# Patient Record
Sex: FEMALE | Race: WHITE | NOT HISPANIC OR LATINO | Employment: FULL TIME | RURAL
[De-identification: names, ages, dates, MRNs, and addresses within clinical notes are randomized per-mention and may not be internally consistent; named-entity substitution may affect disease eponyms.]

---

## 2022-09-23 ENCOUNTER — OFFICE VISIT (OUTPATIENT)
Dept: FAMILY MEDICINE CLINIC | Facility: CLINIC | Age: 32
End: 2022-09-23

## 2022-09-23 VITALS
WEIGHT: 180 LBS | HEART RATE: 81 BPM | DIASTOLIC BLOOD PRESSURE: 80 MMHG | SYSTOLIC BLOOD PRESSURE: 118 MMHG | OXYGEN SATURATION: 98 %

## 2022-09-23 DIAGNOSIS — R51.9 PERSISTENT HEADACHES: ICD-10-CM

## 2022-09-23 DIAGNOSIS — Z00.00 ROUTINE MEDICAL EXAM: Primary | ICD-10-CM

## 2022-09-23 DIAGNOSIS — E56.9 VITAMIN DEFICIENCY: ICD-10-CM

## 2022-09-23 DIAGNOSIS — Z13.1 SCREENING FOR DIABETES MELLITUS: ICD-10-CM

## 2022-09-23 DIAGNOSIS — R00.2 PALPITATIONS: ICD-10-CM

## 2022-09-23 DIAGNOSIS — H53.9 VISION CHANGES: ICD-10-CM

## 2022-09-23 DIAGNOSIS — Z13.220 SCREENING FOR LIPID DISORDERS: ICD-10-CM

## 2022-09-23 PROCEDURE — 99395 PREV VISIT EST AGE 18-39: CPT | Performed by: NURSE PRACTITIONER

## 2022-09-23 PROCEDURE — 36415 COLL VENOUS BLD VENIPUNCTURE: CPT | Performed by: NURSE PRACTITIONER

## 2022-09-23 RX ORDER — TRAZODONE HYDROCHLORIDE 50 MG/1
50-100 TABLET ORAL
COMMUNITY
Start: 2022-09-15

## 2022-09-23 RX ORDER — NORETHINDRONE ACETATE AND ETHINYL ESTRADIOL, ETHINYL ESTRADIOL AND FERROUS FUMARATE 1MG-10(24)
1 KIT ORAL DAILY
COMMUNITY
Start: 2022-07-08

## 2022-09-23 RX ORDER — CRISABOROLE 20 MG/G
OINTMENT TOPICAL
COMMUNITY

## 2022-09-23 RX ORDER — GABAPENTIN 100 MG/1
100-200 CAPSULE ORAL NIGHTLY
COMMUNITY
Start: 2022-08-19

## 2022-09-23 RX ORDER — PROPRANOLOL HYDROCHLORIDE 10 MG/1
TABLET ORAL
COMMUNITY

## 2022-09-23 RX ORDER — SERTRALINE HYDROCHLORIDE 100 MG/1
200 TABLET, FILM COATED ORAL DAILY
COMMUNITY
Start: 2022-09-15

## 2022-09-23 RX ORDER — SULFACETAMIDE SODIUM 100 MG/ML
LOTION TOPICAL
COMMUNITY
Start: 2022-08-01

## 2022-09-23 RX ORDER — DUPILUMAB 300 MG/2ML
INJECTION, SOLUTION SUBCUTANEOUS
COMMUNITY
Start: 2022-07-22

## 2022-09-23 NOTE — PROGRESS NOTES
Chief Complaint  Migraine (Vision changes to left eye. Has seen Optometrist, suggested ocular migraines) and Palpitations    Subjective          Leatha Trejo presents to [facility]  History of Present Illness  Pt has had migraines, vision changes, fatigue, ear ringing, palpitations, anxiety, lightheadedness, and near syncope for the past 2 months. She saw her eye doctor who said her eyes looked fine.   Neurologic Problem  The patient's primary symptoms include near-syncope and a visual change. The patient's pertinent negatives include no altered mental status, clumsiness, focal sensory loss, focal weakness, loss of balance, memory loss, slurred speech, syncope or weakness. This is a new problem. The current episode started more than 1 month ago. The neurological problem developed gradually. The problem has been gradually worsening since onset. There was no focality noted. Associated symptoms include an auditory change, an aura, fatigue, headaches, light-headedness and palpitations. Pertinent negatives include no abdominal pain, back pain, bladder incontinence, bowel incontinence, chest pain, confusion, diaphoresis, dizziness, fever, nausea, neck pain, shortness of breath, vertigo or vomiting. Past treatments include acetaminophen and sleep. The treatment provided mild relief.       Objective   Vital Signs:   /80   Pulse 81   Wt 81.6 kg (180 lb)   SpO2 98%     There is no height or weight on file to calculate BMI.    Review of Systems   Constitutional: Positive for fatigue. Negative for diaphoresis and fever.   Respiratory: Negative for shortness of breath.    Cardiovascular: Positive for palpitations and near-syncope. Negative for chest pain and leg swelling.   Gastrointestinal: Negative for abdominal pain, bowel incontinence, nausea and vomiting.   Genitourinary: Negative for urinary incontinence.   Musculoskeletal: Negative for back pain and neck pain.   Neurological:  Positive for light-headedness. Negative for dizziness, vertigo, focal weakness, syncope, weakness, loss of balance and confusion.   Psychiatric/Behavioral: Negative for memory loss. The patient is not nervous/anxious.           Current Outpatient Medications:   •  Crisaborole (Eucrisa) 2 % ointment, Eucrisa 2 % topical ointment  APPLY TO HANDS TWICE DAILY WHEN NOT USING OTHER, Disp: , Rfl:   •  Dupixent 300 MG/2ML solution pen-injector, , Disp: , Rfl:   •  gabapentin (NEURONTIN) 100 MG capsule, Take 100-200 mg by mouth Every Night., Disp: , Rfl:   •  Lo Loestrin Fe 1 MG-10 MCG / 10 MCG tablet, Take 1 tablet by mouth Daily., Disp: , Rfl:   •  propranolol (INDERAL) 10 MG tablet, propranolol 10 mg tablet  TAKE 1 TABLET BY MOUTH TWICE DAILY  prn, Disp: , Rfl:   •  sertraline (ZOLOFT) 100 MG tablet, Take 200 mg by mouth Daily., Disp: , Rfl:   •  Sulfacetamide Sodium, Acne, 10 % lotion, APPLY TOPICALLY TO THE AFFECTED AREA 1 TO 2 TIMES DAILY, Disp: , Rfl:   •  traZODone (DESYREL) 50 MG tablet, Take  mg by mouth every night at bedtime., Disp: , Rfl:       Allergies: Patient has no known allergies.    Physical Exam  Constitutional:       Appearance: Normal appearance.   HENT:      Head: Normocephalic.   Eyes:      Conjunctiva/sclera: Conjunctivae normal.      Pupils: Pupils are equal, round, and reactive to light.   Cardiovascular:      Rate and Rhythm: Normal rate and regular rhythm.      Heart sounds: Normal heart sounds.   Pulmonary:      Effort: Pulmonary effort is normal.      Breath sounds: Normal breath sounds.   Abdominal:      Tenderness: There is no abdominal tenderness.   Musculoskeletal:         General: Normal range of motion.   Skin:     General: Skin is warm and dry.      Capillary Refill: Capillary refill takes less than 2 seconds.   Neurological:      General: No focal deficit present.      Mental Status: She is alert and oriented to person, place, and time.   Psychiatric:         Mood and Affect:  Mood normal.         Behavior: Behavior normal.         Thought Content: Thought content normal.         Judgment: Judgment normal.          Result Review :                   Assessment and Plan    Diagnoses and all orders for this visit:    1. Routine medical exam (Primary)  Comments:  We discussed diet and exercise.  Labs drawn.  Pap up-to-date  Orders:  -     CBC & Differential; Future  -     Comprehensive Metabolic Panel; Future  -     TSH; Future  -     CBC & Differential  -     Comprehensive Metabolic Panel  -     TSH    2. Screening for diabetes mellitus  -     Hemoglobin A1c; Future  -     Hemoglobin A1c    3. Screening for lipid disorders    4. Vitamin deficiency  -     Vitamin B12; Future  -     Vitamin D 25 Hydroxy; Future  -     Folate; Future  -     Vitamin B12  -     Vitamin D 25 Hydroxy  -     Folate    5. Palpitations  Comments:  Follow-up with cardiology.  Return for worsening symptoms.  Decrease stimulants and caffeine.  Orders:  -     Ambulatory Referral to Cardiology    6. Persistent headaches  Comments:  MRI ordered.  We will refer to neurology pending results.  Orders:  -     Ambulatory Referral to Neurology  -     MRI Brain Without Contrast; Future    7. Vision changes  -     Ambulatory Referral to Neurology  -     MRI Brain Without Contrast; Future                Follow Up   Return in about 1 month (around 10/23/2022) for if not improving or sooner if symptoms worsen.  Patient was given instructions and counseling regarding her condition or for health maintenance advice. Please see specific information pulled into the AVS if appropriate.     CARROLL Henriquez

## 2022-09-24 LAB
25(OH)D3+25(OH)D2 SERPL-MCNC: 23.9 NG/ML (ref 30–100)
ALBUMIN SERPL-MCNC: 4.3 G/DL (ref 3.8–4.8)
ALBUMIN/GLOB SERPL: 1.9 {RATIO} (ref 1.2–2.2)
ALP SERPL-CCNC: 78 IU/L (ref 44–121)
ALT SERPL-CCNC: 25 IU/L (ref 0–32)
AST SERPL-CCNC: 22 IU/L (ref 0–40)
BASOPHILS # BLD AUTO: 0 X10E3/UL (ref 0–0.2)
BASOPHILS NFR BLD AUTO: 1 %
BILIRUB SERPL-MCNC: 0.3 MG/DL (ref 0–1.2)
BUN SERPL-MCNC: 12 MG/DL (ref 6–20)
BUN/CREAT SERPL: 22 (ref 9–23)
CALCIUM SERPL-MCNC: 9.1 MG/DL (ref 8.7–10.2)
CHLORIDE SERPL-SCNC: 104 MMOL/L (ref 96–106)
CO2 SERPL-SCNC: 25 MMOL/L (ref 20–29)
CREAT SERPL-MCNC: 0.54 MG/DL (ref 0.57–1)
EGFRCR SERPLBLD CKD-EPI 2021: 126 ML/MIN/1.73
EOSINOPHIL # BLD AUTO: 0.1 X10E3/UL (ref 0–0.4)
EOSINOPHIL NFR BLD AUTO: 2 %
ERYTHROCYTE [DISTWIDTH] IN BLOOD BY AUTOMATED COUNT: 11.7 % (ref 11.7–15.4)
FOLATE SERPL-MCNC: 10.3 NG/ML
GLOBULIN SER CALC-MCNC: 2.3 G/DL (ref 1.5–4.5)
GLUCOSE SERPL-MCNC: 82 MG/DL (ref 65–99)
HBA1C MFR BLD: 5.6 % (ref 4.8–5.6)
HCT VFR BLD AUTO: 37.8 % (ref 34–46.6)
HGB BLD-MCNC: 12.6 G/DL (ref 11.1–15.9)
IMM GRANULOCYTES # BLD AUTO: 0 X10E3/UL (ref 0–0.1)
IMM GRANULOCYTES NFR BLD AUTO: 0 %
LYMPHOCYTES # BLD AUTO: 1.8 X10E3/UL (ref 0.7–3.1)
LYMPHOCYTES NFR BLD AUTO: 29 %
MCH RBC QN AUTO: 30.7 PG (ref 26.6–33)
MCHC RBC AUTO-ENTMCNC: 33.3 G/DL (ref 31.5–35.7)
MCV RBC AUTO: 92 FL (ref 79–97)
MONOCYTES # BLD AUTO: 0.4 X10E3/UL (ref 0.1–0.9)
MONOCYTES NFR BLD AUTO: 7 %
NEUTROPHILS # BLD AUTO: 3.8 X10E3/UL (ref 1.4–7)
NEUTROPHILS NFR BLD AUTO: 61 %
PLATELET # BLD AUTO: 279 X10E3/UL (ref 150–450)
POTASSIUM SERPL-SCNC: 4.7 MMOL/L (ref 3.5–5.2)
PROT SERPL-MCNC: 6.6 G/DL (ref 6–8.5)
RBC # BLD AUTO: 4.11 X10E6/UL (ref 3.77–5.28)
SODIUM SERPL-SCNC: 144 MMOL/L (ref 134–144)
TSH SERPL DL<=0.005 MIU/L-ACNC: 0.78 UIU/ML (ref 0.45–4.5)
VIT B12 SERPL-MCNC: 469 PG/ML (ref 232–1245)
WBC # BLD AUTO: 6.2 X10E3/UL (ref 3.4–10.8)

## 2022-09-27 NOTE — PROGRESS NOTES
Your vitamin D was low so take 2,000 units of vitamin D daily. Everything else looked good. Take care!

## 2022-09-29 PROBLEM — R00.2 PALPITATIONS: Status: ACTIVE | Noted: 2022-09-29

## 2022-10-03 ENCOUNTER — OFFICE VISIT (OUTPATIENT)
Dept: CARDIOLOGY | Facility: CLINIC | Age: 32
End: 2022-10-03

## 2022-10-03 VITALS
SYSTOLIC BLOOD PRESSURE: 108 MMHG | WEIGHT: 179.4 LBS | HEART RATE: 79 BPM | DIASTOLIC BLOOD PRESSURE: 66 MMHG | BODY MASS INDEX: 25.68 KG/M2 | HEIGHT: 70 IN | OXYGEN SATURATION: 98 %

## 2022-10-03 DIAGNOSIS — R06.09 DOE (DYSPNEA ON EXERTION): Primary | ICD-10-CM

## 2022-10-03 DIAGNOSIS — R07.2 PRECORDIAL CHEST PAIN: ICD-10-CM

## 2022-10-03 DIAGNOSIS — R00.2 PALPITATIONS: ICD-10-CM

## 2022-10-03 PROCEDURE — 93000 ELECTROCARDIOGRAM COMPLETE: CPT | Performed by: INTERNAL MEDICINE

## 2022-10-03 PROCEDURE — 99204 OFFICE O/P NEW MOD 45 MIN: CPT | Performed by: INTERNAL MEDICINE

## 2022-10-03 RX ORDER — CLOBETASOL PROPIONATE 0.5 MG/G
1 OINTMENT TOPICAL 2 TIMES DAILY
COMMUNITY

## 2022-10-03 RX ORDER — ESZOPICLONE 2 MG/1
2 TABLET, FILM COATED ORAL NIGHTLY
COMMUNITY
End: 2023-03-10

## 2022-10-22 ENCOUNTER — HOSPITAL ENCOUNTER (OUTPATIENT)
Dept: MRI IMAGING | Facility: HOSPITAL | Age: 32
Discharge: HOME | End: 2022-10-22
Admitting: NURSE PRACTITIONER

## 2022-10-22 DIAGNOSIS — R51.9 PERSISTENT HEADACHES: ICD-10-CM

## 2022-10-22 DIAGNOSIS — H53.9 VISION CHANGES: ICD-10-CM

## 2022-10-22 PROCEDURE — 70551 MRI BRAIN STEM W/O DYE: CPT

## 2022-10-25 ENCOUNTER — TELEPHONE (OUTPATIENT)
Dept: CARDIOLOGY | Facility: CLINIC | Age: 32
End: 2022-10-25

## 2022-10-25 NOTE — TELEPHONE ENCOUNTER
----- Message from Gigi Corley MD sent at 10/25/2022  8:49 AM EDT -----  Please inform the patient of their test results. Thank you.

## 2023-03-10 ENCOUNTER — OFFICE VISIT (OUTPATIENT)
Dept: FAMILY MEDICINE CLINIC | Facility: CLINIC | Age: 33
End: 2023-03-10
Payer: COMMERCIAL

## 2023-03-10 VITALS
BODY MASS INDEX: 26.34 KG/M2 | HEIGHT: 70 IN | SYSTOLIC BLOOD PRESSURE: 118 MMHG | WEIGHT: 184 LBS | HEART RATE: 89 BPM | DIASTOLIC BLOOD PRESSURE: 74 MMHG | OXYGEN SATURATION: 97 %

## 2023-03-10 DIAGNOSIS — Z13.220 LIPID SCREENING: ICD-10-CM

## 2023-03-10 DIAGNOSIS — R53.83 FATIGUE, UNSPECIFIED TYPE: ICD-10-CM

## 2023-03-10 DIAGNOSIS — Z00.00 ROUTINE PHYSICAL EXAMINATION: Primary | ICD-10-CM

## 2023-03-10 DIAGNOSIS — Z13.1 DIABETES MELLITUS SCREENING: ICD-10-CM

## 2023-03-10 DIAGNOSIS — Z13.29 THYROID DISORDER SCREENING: ICD-10-CM

## 2023-03-10 PROCEDURE — 99395 PREV VISIT EST AGE 18-39: CPT | Performed by: PHYSICIAN ASSISTANT

## 2023-03-10 PROCEDURE — 36415 COLL VENOUS BLD VENIPUNCTURE: CPT | Performed by: PHYSICIAN ASSISTANT

## 2023-03-10 RX ORDER — LAMOTRIGINE 100 MG/1
TABLET ORAL
COMMUNITY
Start: 2023-02-02

## 2023-03-10 NOTE — PROGRESS NOTES
"Chief Complaint  Annual Exam (Wants to be checked for diabetes )    Subjective          Leatha Trejo presents to [facility]  History of Present Illness  Patient today for routine physical exam as well as labs.  She states has been bothered by fatigue for the last year.  She was diagnosed with sleep apnea and using a CPAP machine but has not noticed significant improvement with her fatigue levels.  She is wanting to be tested for diabetes as runs in her family.  She states she has noticed some increase in thirst at times.  She has been trying to work on a lower carb diet.  She states she gets exercise.  She denies any chest pain or shortness of breath.  She is seeing behavioral health for her anxiety and depression medications.  She is currently up-to-date on her GYN exam. She is currently utd on eye exam- was diagnosed with some floaters.       Objective   Vital Signs:   /74 (BP Location: Left arm, Patient Position: Sitting, Cuff Size: Large Adult)   Pulse 89   Ht 176.5 cm (69.5\")   Wt 83.5 kg (184 lb)   SpO2 97%   BMI 26.78 kg/m²     Body mass index is 26.78 kg/m².    Review of Systems   Constitutional: Positive for fatigue.   HENT: Negative for congestion and sore throat.    Respiratory: Negative for cough and shortness of breath.    Cardiovascular: Negative for chest pain and palpitations.   Gastrointestinal: Negative for abdominal pain, diarrhea, nausea and vomiting.   Endocrine: Positive for polydipsia. Negative for polyphagia and polyuria.   Genitourinary: Negative for dysuria and frequency.   Neurological: Negative for dizziness and headache.   Psychiatric/Behavioral: Positive for sleep disturbance and depressed mood. The patient is nervous/anxious.        Past History:  Medical History: has a past medical history of Anxiety, Depression, IBS (irritable bowel syndrome), and Insomnia.   Surgical History: has a past surgical history that includes  section and " Tonsillectomy (2011).   Family History: family history includes Diabetes in her father; Obesity in her mother.   Social History: reports that she has never smoked. She has never used smokeless tobacco. She reports that she does not currently use alcohol. She reports that she does not use drugs.      Current Outpatient Medications:   •  clobetasol (TEMOVATE) 0.05 % ointment, Apply 1 application topically to the appropriate area as directed 2 (Two) Times a Day., Disp: , Rfl:   •  Crisaborole (Eucrisa) 2 % ointment, Eucrisa 2 % topical ointment  APPLY TO HANDS TWICE DAILY WHEN NOT USING OTHER, Disp: , Rfl:   •  Dupixent 300 MG/2ML solution pen-injector, Every 2 (Two) Months., Disp: , Rfl:   •  gabapentin (NEURONTIN) 100 MG capsule, Take 1-2 capsules by mouth Every Night., Disp: , Rfl:   •  lamoTRIgine (LaMICtal) 100 MG tablet, , Disp: , Rfl:   •  Lo Loestrin Fe 1 MG-10 MCG / 10 MCG tablet, Take 1 tablet by mouth Daily., Disp: , Rfl:   •  propranolol (INDERAL) 10 MG tablet, propranolol 10 mg tablet  TAKE 1 TABLET BY MOUTH TWICE DAILY  prn, Disp: , Rfl:   •  sertraline (ZOLOFT) 100 MG tablet, Take 2 tablets by mouth Daily., Disp: , Rfl:   •  Sulfacetamide Sodium, Acne, 10 % lotion, APPLY TOPICALLY TO THE AFFECTED AREA 1 TO 2 TIMES DAILY, Disp: , Rfl:   •  traZODone (DESYREL) 50 MG tablet, Take 1-2 tablets by mouth every night at bedtime., Disp: , Rfl:   Allergies: Patient has no known allergies.    Physical Exam  Constitutional:       Appearance: Normal appearance.   HENT:      Right Ear: Tympanic membrane normal.      Left Ear: Tympanic membrane normal.      Mouth/Throat:      Pharynx: Oropharynx is clear.   Eyes:      Conjunctiva/sclera: Conjunctivae normal.      Pupils: Pupils are equal, round, and reactive to light.   Cardiovascular:      Rate and Rhythm: Normal rate and regular rhythm.      Heart sounds: Normal heart sounds.   Pulmonary:      Effort: Pulmonary effort is normal.      Breath sounds: Normal breath  sounds.   Abdominal:      Palpations: Abdomen is soft.      Tenderness: There is no abdominal tenderness.   Neurological:      Mental Status: She is oriented to person, place, and time.   Psychiatric:         Mood and Affect: Mood normal.         Behavior: Behavior normal.             Assessment and Plan   Diagnoses and all orders for this visit:    1. Routine physical examination (Primary)  Encouraged healthy diet and exercise. Will check labs today. Encouraged to continue f/up with behavioral health as directed for her med management.    2. Lipid screening  -     Lipid Panel; Future  Lipid panel today.   3. Diabetes mellitus screening  -     Comprehensive Metabolic Panel; Future  -     Hemoglobin A1c; Future  Hga1c with labs.   4. Thyroid disorder screening  -     TSH; Future    5. Fatigue, unspecified type  -     CBC & Differential; Future  -     Vitamin B12; Future  Continue with cpap . Will check some labs today. RTC if not improving.          Follow Up   No follow-ups on file.  Patient was given instructions and counseling regarding her condition or for health maintenance advice. Please see specific information pulled into the AVS if appropriate.     Yajaira Gee PA-C

## 2023-03-11 LAB
ALBUMIN SERPL-MCNC: 4.5 G/DL (ref 3.8–4.8)
ALBUMIN/GLOB SERPL: 1.9 {RATIO} (ref 1.2–2.2)
ALP SERPL-CCNC: 78 IU/L (ref 44–121)
ALT SERPL-CCNC: 27 IU/L (ref 0–32)
AST SERPL-CCNC: 23 IU/L (ref 0–40)
BASOPHILS # BLD AUTO: 0.1 X10E3/UL (ref 0–0.2)
BASOPHILS NFR BLD AUTO: 1 %
BILIRUB SERPL-MCNC: 0.5 MG/DL (ref 0–1.2)
BUN SERPL-MCNC: 14 MG/DL (ref 6–20)
BUN/CREAT SERPL: 17 (ref 9–23)
CALCIUM SERPL-MCNC: 9.3 MG/DL (ref 8.7–10.2)
CHLORIDE SERPL-SCNC: 103 MMOL/L (ref 96–106)
CHOLEST SERPL-MCNC: 172 MG/DL (ref 100–199)
CO2 SERPL-SCNC: 26 MMOL/L (ref 20–29)
CREAT SERPL-MCNC: 0.82 MG/DL (ref 0.57–1)
EGFRCR SERPLBLD CKD-EPI 2021: 97 ML/MIN/1.73
EOSINOPHIL # BLD AUTO: 0.1 X10E3/UL (ref 0–0.4)
EOSINOPHIL NFR BLD AUTO: 1 %
ERYTHROCYTE [DISTWIDTH] IN BLOOD BY AUTOMATED COUNT: 11.8 % (ref 11.7–15.4)
GLOBULIN SER CALC-MCNC: 2.4 G/DL (ref 1.5–4.5)
GLUCOSE SERPL-MCNC: 93 MG/DL (ref 70–99)
HBA1C MFR BLD: 5.4 % (ref 4.8–5.6)
HCT VFR BLD AUTO: 40.3 % (ref 34–46.6)
HDLC SERPL-MCNC: 65 MG/DL
HGB BLD-MCNC: 13.6 G/DL (ref 11.1–15.9)
IMM GRANULOCYTES # BLD AUTO: 0 X10E3/UL (ref 0–0.1)
IMM GRANULOCYTES NFR BLD AUTO: 0 %
LDLC SERPL CALC-MCNC: 91 MG/DL (ref 0–99)
LYMPHOCYTES # BLD AUTO: 2 X10E3/UL (ref 0.7–3.1)
LYMPHOCYTES NFR BLD AUTO: 30 %
MCH RBC QN AUTO: 30.6 PG (ref 26.6–33)
MCHC RBC AUTO-ENTMCNC: 33.7 G/DL (ref 31.5–35.7)
MCV RBC AUTO: 91 FL (ref 79–97)
MONOCYTES # BLD AUTO: 0.5 X10E3/UL (ref 0.1–0.9)
MONOCYTES NFR BLD AUTO: 8 %
NEUTROPHILS # BLD AUTO: 4 X10E3/UL (ref 1.4–7)
NEUTROPHILS NFR BLD AUTO: 60 %
PLATELET # BLD AUTO: 259 X10E3/UL (ref 150–450)
POTASSIUM SERPL-SCNC: 4 MMOL/L (ref 3.5–5.2)
PROT SERPL-MCNC: 6.9 G/DL (ref 6–8.5)
RBC # BLD AUTO: 4.44 X10E6/UL (ref 3.77–5.28)
SODIUM SERPL-SCNC: 142 MMOL/L (ref 134–144)
TRIGL SERPL-MCNC: 86 MG/DL (ref 0–149)
TSH SERPL DL<=0.005 MIU/L-ACNC: 1.03 UIU/ML (ref 0.45–4.5)
VIT B12 SERPL-MCNC: 620 PG/ML (ref 232–1245)
VLDLC SERPL CALC-MCNC: 16 MG/DL (ref 5–40)
WBC # BLD AUTO: 6.7 X10E3/UL (ref 3.4–10.8)

## 2023-03-22 ENCOUNTER — TELEPHONE (OUTPATIENT)
Dept: FAMILY MEDICINE CLINIC | Facility: CLINIC | Age: 33
End: 2023-03-22
Payer: COMMERCIAL

## 2023-03-22 NOTE — TELEPHONE ENCOUNTER
Please call pharmacy and see whom has been prescribing and when was last fill- she will need a psychiatry appt; thanks

## 2023-03-22 NOTE — TELEPHONE ENCOUNTER
Patient states her mental health clinic has closed. She wants to know if you would be able to fill her medications?

## 2023-03-27 NOTE — TELEPHONE ENCOUNTER
Angeline Jordan MA         12:22 PM  Note   HUB TO READ  Called [facility] got pt an appt with Dr. Arredondo on may 4th at 10 am needs to be 15-20 min early and let know is at office in [place].   They said they will send a message back to Dr. Arredondo to ask if can fill rx until her appt.          PATIENT INFORMED OF HUB TO READ AND UNDERSTOOD

## 2023-03-27 NOTE — TELEPHONE ENCOUNTER
Please call [facility] and see if this provider was through them and see if can schedule patient an appt with other provider; thanks

## 2023-03-27 NOTE — TELEPHONE ENCOUNTER
HUB TO READ  Called [facility] got pt an appt with Dr. Arredondo on may 4th at 10 am needs to be 15-20 min early and let know is at office in [place].   They said they will send a message back to Dr. Arredondo to ask if can fill rx until her appt.

## 2023-03-27 NOTE — TELEPHONE ENCOUNTER
"Called pharmacy they said   Qum055 mg 1-2 tab  filled 2/2 30 day supply by Dr. Joey Arredondo  Sertraline- 30 days filled 3/11 same dr Sarkarzodone 50 1-2 tab 30 day supply on 3/11 same Dr.  Lamictal-100 1/2 2x a day filled on 3/11 for 30days. Same BHASKAR Krishnamurthy TO READ  \" TRIED TO CALL PT TO LET KNOW NEEDS A PSYCHIATRY APPT.\"   "

## 2024-04-02 ENCOUNTER — TELEPHONE (OUTPATIENT)
Dept: FAMILY MEDICINE CLINIC | Facility: CLINIC | Age: 34
End: 2024-04-02

## 2024-04-02 NOTE — TELEPHONE ENCOUNTER
Spoke with patient. Advised patient needs an appointment. Pt made an appointment April 12th at 3:15

## 2024-04-02 NOTE — TELEPHONE ENCOUNTER
Caller: Leatha Trejo    Relationship: Self    Best call back number: [phone]     What orders are you requesting (i.e. lab or imaging): BLOOD WORK    In what timeframe would the patient need to come in: ASAP    Where will you receive your lab/imaging services: OFFICE    Additional notes: PT WOULD LIKE ORDERS FOR BLOOD WORK FOR FATIGUE, HORMONES AND THYROID.

## 2024-04-12 ENCOUNTER — OFFICE VISIT (OUTPATIENT)
Dept: FAMILY MEDICINE CLINIC | Facility: CLINIC | Age: 34
End: 2024-04-12
Payer: COMMERCIAL

## 2024-04-12 VITALS
SYSTOLIC BLOOD PRESSURE: 100 MMHG | OXYGEN SATURATION: 97 % | HEART RATE: 84 BPM | HEIGHT: 70 IN | WEIGHT: 179 LBS | BODY MASS INDEX: 25.62 KG/M2 | DIASTOLIC BLOOD PRESSURE: 70 MMHG

## 2024-04-12 DIAGNOSIS — M25.50 MULTIPLE JOINT PAIN: ICD-10-CM

## 2024-04-12 DIAGNOSIS — Z13.220 LIPID SCREENING: ICD-10-CM

## 2024-04-12 DIAGNOSIS — Z13.29 THYROID DISORDER SCREENING: ICD-10-CM

## 2024-04-12 DIAGNOSIS — Z13.1 DIABETES MELLITUS SCREENING: ICD-10-CM

## 2024-04-12 DIAGNOSIS — R19.8 ALTERNATING CONSTIPATION AND DIARRHEA: ICD-10-CM

## 2024-04-12 DIAGNOSIS — Z11.59 ENCOUNTER FOR HEPATITIS C SCREENING TEST FOR LOW RISK PATIENT: ICD-10-CM

## 2024-04-12 DIAGNOSIS — Z00.00 ROUTINE PHYSICAL EXAMINATION: Primary | ICD-10-CM

## 2024-04-12 PROCEDURE — 99395 PREV VISIT EST AGE 18-39: CPT | Performed by: PHYSICIAN ASSISTANT

## 2024-04-15 LAB
ALBUMIN SERPL-MCNC: 4 G/DL (ref 3.9–4.9)
ALBUMIN/GLOB SERPL: 1.3 {RATIO} (ref 1.2–2.2)
ALP SERPL-CCNC: 84 IU/L (ref 44–121)
ALT SERPL-CCNC: 36 IU/L (ref 0–32)
ANA SER QL: POSITIVE
AST SERPL-CCNC: 24 IU/L (ref 0–40)
BASOPHILS # BLD AUTO: 0 X10E3/UL (ref 0–0.2)
BASOPHILS NFR BLD AUTO: 1 %
BILIRUB SERPL-MCNC: 0.3 MG/DL (ref 0–1.2)
BUN SERPL-MCNC: 16 MG/DL (ref 6–20)
BUN/CREAT SERPL: 22 (ref 9–23)
CALCIUM SERPL-MCNC: 9.2 MG/DL (ref 8.7–10.2)
CHLORIDE SERPL-SCNC: 102 MMOL/L (ref 96–106)
CHOLEST SERPL-MCNC: 153 MG/DL (ref 100–199)
CO2 SERPL-SCNC: 25 MMOL/L (ref 20–29)
CREAT SERPL-MCNC: 0.72 MG/DL (ref 0.57–1)
DSDNA AB SER-ACNC: <1 IU/ML (ref 0–9)
EGFRCR SERPLBLD CKD-EPI 2021: 113 ML/MIN/1.73
EOSINOPHIL # BLD AUTO: 0.1 X10E3/UL (ref 0–0.4)
EOSINOPHIL NFR BLD AUTO: 1 %
ERYTHROCYTE [DISTWIDTH] IN BLOOD BY AUTOMATED COUNT: 11.8 % (ref 11.7–15.4)
ERYTHROCYTE [SEDIMENTATION RATE] IN BLOOD BY WESTERGREN METHOD: 2 MM/HR (ref 0–32)
GLOBULIN SER CALC-MCNC: 3 G/DL (ref 1.5–4.5)
GLUCOSE SERPL-MCNC: 106 MG/DL (ref 70–99)
HBA1C MFR BLD: 5.8 % (ref 4.8–5.6)
HCT VFR BLD AUTO: 39 % (ref 34–46.6)
HCV IGG SERPL QL IA: NON REACTIVE
HDLC SERPL-MCNC: 58 MG/DL
HGB BLD-MCNC: 13.1 G/DL (ref 11.1–15.9)
IMM GRANULOCYTES # BLD AUTO: 0 X10E3/UL (ref 0–0.1)
IMM GRANULOCYTES NFR BLD AUTO: 0 %
LDLC SERPL CALC-MCNC: 82 MG/DL (ref 0–99)
LYMPHOCYTES # BLD AUTO: 2.2 X10E3/UL (ref 0.7–3.1)
LYMPHOCYTES NFR BLD AUTO: 31 %
Lab: NORMAL
MCH RBC QN AUTO: 30.6 PG (ref 26.6–33)
MCHC RBC AUTO-ENTMCNC: 33.6 G/DL (ref 31.5–35.7)
MCV RBC AUTO: 91 FL (ref 79–97)
MONOCYTES # BLD AUTO: 0.4 X10E3/UL (ref 0.1–0.9)
MONOCYTES NFR BLD AUTO: 5 %
NEUTROPHILS # BLD AUTO: 4.4 X10E3/UL (ref 1.4–7)
NEUTROPHILS NFR BLD AUTO: 62 %
PLATELET # BLD AUTO: 268 X10E3/UL (ref 150–450)
POTASSIUM SERPL-SCNC: 4.4 MMOL/L (ref 3.5–5.2)
PROT SERPL-MCNC: 7 G/DL (ref 6–8.5)
RBC # BLD AUTO: 4.28 X10E6/UL (ref 3.77–5.28)
RHEUMATOID FACT SERPL-ACNC: <10 IU/ML
SODIUM SERPL-SCNC: 138 MMOL/L (ref 134–144)
TRIGL SERPL-MCNC: 65 MG/DL (ref 0–149)
TSH SERPL DL<=0.005 MIU/L-ACNC: 0.55 UIU/ML (ref 0.45–4.5)
VLDLC SERPL CALC-MCNC: 13 MG/DL (ref 5–40)
WBC # BLD AUTO: 7.1 X10E3/UL (ref 3.4–10.8)

## 2024-04-15 NOTE — PROGRESS NOTES
"Chief Complaint  Fatigue (Fatigue going on for years seen ) and GI Problem (Pt asking for referral for GI has constipation and diarrhea off and on for years but noticed getting worse )    Subjective          Leatha Trejo presents to [facility]  History of Present Illness  Patient in today for physical exam and would like to return to clinic for  fasting labs. States has been bothered by intermittent symptoms of fatigue for the past year along with multiple joint pains . States in past had abnormal autoimmune screen but has not f/up with rheumatology for past few years. She also states has alternating diarrhea and constipation that has progressed and would like to get in with GI for consult. Denies blood in stool.   Fatigue  This is a chronic problem. Associated symptoms include arthralgias and fatigue. Pertinent negatives include no abdominal pain, congestion, coughing, fever, nausea, sore throat or vomiting.   GI Problem  The primary symptoms include fatigue and arthralgias. Primary symptoms do not include fever, abdominal pain, nausea, vomiting, diarrhea, melena or dysuria.       Objective   Vital Signs:   /70   Pulse 84   Ht 176.5 cm (69.5\")   Wt 81.2 kg (179 lb)   SpO2 97%   BMI 26.05 kg/m²     Body mass index is 26.05 kg/m².    Review of Systems   Constitutional:  Positive for fatigue. Negative for fever.   HENT:  Negative for congestion and sore throat.    Respiratory:  Negative for cough.    Gastrointestinal:  Negative for abdominal pain, diarrhea, melena, nausea and vomiting.   Genitourinary:  Negative for dysuria and frequency.   Musculoskeletal:  Positive for arthralgias.   Neurological:  Negative for dizziness and headache.       Past History:  Medical History: has a past medical history of Anxiety, Depression, IBS (irritable bowel syndrome), and Insomnia.   Surgical History: has a past surgical history that includes  section and Tonsillectomy (). "   Family History: family history includes Arthritis in her mother; Depression in her father and mother; Diabetes in her father and mother; Hyperlipidemia in her father; Obesity in her mother; Other in her father.   Social History: reports that she has never smoked. She has never used smokeless tobacco. She reports that she does not currently use alcohol. She reports that she does not use drugs.      Current Outpatient Medications:     clobetasol (TEMOVATE) 0.05 % ointment, Apply 1 Application topically to the appropriate area as directed 2 (Two) Times a Day., Disp: , Rfl:     Crisaborole (Eucrisa) 2 % ointment, Eucrisa 2 % topical ointment  APPLY TO HANDS TWICE DAILY WHEN NOT USING OTHER, Disp: , Rfl:     Dupixent 300 MG/2ML solution pen-injector, Every 2 (Two) Months., Disp: , Rfl:     gabapentin (NEURONTIN) 100 MG capsule, Take 1-2 capsules by mouth Every Night., Disp: , Rfl:     sertraline (ZOLOFT) 100 MG tablet, Take 2 tablets by mouth Daily., Disp: , Rfl:     Sulfacetamide Sodium, Acne, 10 % lotion, APPLY TOPICALLY TO THE AFFECTED AREA 1 TO 2 TIMES DAILY, Disp: , Rfl:     traZODone (DESYREL) 50 MG tablet, Take 1-2 tablets by mouth every night at bedtime., Disp: , Rfl:   Allergies: Patient has no known allergies.    Physical Exam  Constitutional:       Appearance: Normal appearance.   HENT:      Right Ear: Tympanic membrane normal.      Left Ear: Tympanic membrane normal.      Mouth/Throat:      Pharynx: Oropharynx is clear.   Eyes:      Conjunctiva/sclera: Conjunctivae normal.      Pupils: Pupils are equal, round, and reactive to light.   Cardiovascular:      Rate and Rhythm: Normal rate and regular rhythm.      Heart sounds: Normal heart sounds.   Pulmonary:      Effort: Pulmonary effort is normal.      Breath sounds: Normal breath sounds.   Abdominal:      Palpations: Abdomen is soft.      Tenderness: There is no abdominal tenderness.   Neurological:      Mental Status: She is oriented to person, place, and  time.   Psychiatric:         Mood and Affect: Mood normal.         Behavior: Behavior normal.             Assessment and Plan   Diagnoses and all orders for this visit:    1. Routine physical examination (Primary)  -     CBC & Differential  Encouraged healthy diet and exercise. Encouraged to stay utd on eye exam, dental exam and gyn exam.   2. Alternating constipation and diarrhea  -     Ambulatory Referral to Gastroenterology  Will put in referral for GI consult.   3. Encounter for hepatitis C screening test for low risk patient  -     Hepatitis C Antibody    4. Diabetes mellitus screening  -     Comprehensive Metabolic Panel  -     Hemoglobin A1c  Will check hga1c with labs. Encouraged healthy diet  and exercise.   5. Thyroid disorder screening  -     TSH  Will check TSH with labs.   6. Lipid screening  -     Lipid Panel    7. Multiple joint pain  -     AIDA  -     Sedimentation Rate  -     Rheumatoid Factor  Will check AIDA , ESR and RF with labs and she may follow back up with rheumatology.           Follow Up   No follow-ups on file.  Patient was given instructions and counseling regarding her condition or for health maintenance advice. Please see specific information pulled into the AVS if appropriate.     Yajaira Gee PA-C

## 2024-04-17 DIAGNOSIS — R74.8 ELEVATED LIVER ENZYMES: Primary | ICD-10-CM

## 2024-04-18 DIAGNOSIS — R76.8 POSITIVE ANA (ANTINUCLEAR ANTIBODY): Primary | ICD-10-CM

## 2024-04-18 DIAGNOSIS — M25.50 MULTIPLE JOINT PAIN: ICD-10-CM

## 2024-05-27 DIAGNOSIS — G47.30 SLEEP APNEA, UNSPECIFIED TYPE: Primary | ICD-10-CM

## 2024-05-31 ENCOUNTER — LAB (OUTPATIENT)
Dept: FAMILY MEDICINE CLINIC | Facility: CLINIC | Age: 34
End: 2024-05-31
Payer: COMMERCIAL

## 2024-06-01 LAB
ALBUMIN SERPL-MCNC: 4.3 G/DL (ref 3.9–4.9)
ALBUMIN/GLOB SERPL: 1.7 {RATIO} (ref 1.2–2.2)
ALP SERPL-CCNC: 75 IU/L (ref 44–121)
ALT SERPL-CCNC: 43 IU/L (ref 0–32)
AST SERPL-CCNC: 27 IU/L (ref 0–40)
BILIRUB SERPL-MCNC: 0.8 MG/DL (ref 0–1.2)
BUN SERPL-MCNC: 13 MG/DL (ref 6–20)
BUN/CREAT SERPL: 19 (ref 9–23)
CALCIUM SERPL-MCNC: 9.3 MG/DL (ref 8.7–10.2)
CHLORIDE SERPL-SCNC: 103 MMOL/L (ref 96–106)
CO2 SERPL-SCNC: 24 MMOL/L (ref 20–29)
CREAT SERPL-MCNC: 0.67 MG/DL (ref 0.57–1)
EGFRCR SERPLBLD CKD-EPI 2021: 118 ML/MIN/1.73
GLOBULIN SER CALC-MCNC: 2.5 G/DL (ref 1.5–4.5)
GLUCOSE SERPL-MCNC: 91 MG/DL (ref 70–99)
POTASSIUM SERPL-SCNC: 4.5 MMOL/L (ref 3.5–5.2)
PROT SERPL-MCNC: 6.8 G/DL (ref 6–8.5)
SODIUM SERPL-SCNC: 139 MMOL/L (ref 134–144)

## 2024-06-10 ENCOUNTER — OFFICE VISIT (OUTPATIENT)
Age: 34
End: 2024-06-10
Payer: COMMERCIAL

## 2024-06-10 ENCOUNTER — LAB (OUTPATIENT)
Facility: HOSPITAL | Age: 34
End: 2024-06-10
Payer: COMMERCIAL

## 2024-06-10 VITALS
WEIGHT: 176.6 LBS | TEMPERATURE: 97.6 F | HEART RATE: 68 BPM | BODY MASS INDEX: 26.16 KG/M2 | SYSTOLIC BLOOD PRESSURE: 92 MMHG | HEIGHT: 69 IN | DIASTOLIC BLOOD PRESSURE: 62 MMHG

## 2024-06-10 DIAGNOSIS — M25.50 ARTHRALGIA OF MULTIPLE SITES: ICD-10-CM

## 2024-06-10 DIAGNOSIS — G47.33 OSA (OBSTRUCTIVE SLEEP APNEA): ICD-10-CM

## 2024-06-10 DIAGNOSIS — R76.8 ANA POSITIVE: Primary | ICD-10-CM

## 2024-06-10 DIAGNOSIS — K58.9 IRRITABLE BOWEL SYNDROME, UNSPECIFIED TYPE: ICD-10-CM

## 2024-06-10 DIAGNOSIS — R79.89 LFT ELEVATION: ICD-10-CM

## 2024-06-10 DIAGNOSIS — R76.8 ANA POSITIVE: ICD-10-CM

## 2024-06-10 DIAGNOSIS — R53.83 OTHER FATIGUE: ICD-10-CM

## 2024-06-10 DIAGNOSIS — G25.81 RLS (RESTLESS LEGS SYNDROME): ICD-10-CM

## 2024-06-10 LAB
ALBUMIN SERPL-MCNC: 4.7 G/DL (ref 3.5–5.2)
ALBUMIN/GLOB SERPL: 1.6 G/DL
ALP SERPL-CCNC: 92 U/L (ref 39–117)
ALT SERPL W P-5'-P-CCNC: 86 U/L (ref 1–33)
ANION GAP SERPL CALCULATED.3IONS-SCNC: 12.7 MMOL/L (ref 5–15)
AST SERPL-CCNC: 41 U/L (ref 1–32)
BASOPHILS # BLD AUTO: 0.03 10*3/MM3 (ref 0–0.2)
BASOPHILS NFR BLD AUTO: 0.4 % (ref 0–1.5)
BILIRUB SERPL-MCNC: 0.4 MG/DL (ref 0–1.2)
BUN SERPL-MCNC: 11 MG/DL (ref 6–20)
BUN/CREAT SERPL: 18 (ref 7–25)
CALCIUM SPEC-SCNC: 9.6 MG/DL (ref 8.6–10.5)
CHLORIDE SERPL-SCNC: 101 MMOL/L (ref 98–107)
CHROMATIN AB SERPL-ACNC: <10 IU/ML (ref 0–14)
CO2 SERPL-SCNC: 24.3 MMOL/L (ref 22–29)
CREAT SERPL-MCNC: 0.61 MG/DL (ref 0.57–1)
CRP SERPL-MCNC: <0.3 MG/DL (ref 0–0.5)
DEPRECATED RDW RBC AUTO: 37.5 FL (ref 37–54)
EGFRCR SERPLBLD CKD-EPI 2021: 121.2 ML/MIN/1.73
EOSINOPHIL # BLD AUTO: 0.05 10*3/MM3 (ref 0–0.4)
EOSINOPHIL NFR BLD AUTO: 0.7 % (ref 0.3–6.2)
ERYTHROCYTE [DISTWIDTH] IN BLOOD BY AUTOMATED COUNT: 11.4 % (ref 12.3–15.4)
ERYTHROCYTE [SEDIMENTATION RATE] IN BLOOD: 11 MM/HR (ref 0–20)
GLOBULIN UR ELPH-MCNC: 3 GM/DL
GLUCOSE SERPL-MCNC: 102 MG/DL (ref 65–99)
HCT VFR BLD AUTO: 41 % (ref 34–46.6)
HGB BLD-MCNC: 13.6 G/DL (ref 12–15.9)
IMM GRANULOCYTES # BLD AUTO: 0.02 10*3/MM3 (ref 0–0.05)
IMM GRANULOCYTES NFR BLD AUTO: 0.3 % (ref 0–0.5)
LYMPHOCYTES # BLD AUTO: 2.46 10*3/MM3 (ref 0.7–3.1)
LYMPHOCYTES NFR BLD AUTO: 35.7 % (ref 19.6–45.3)
MCH RBC QN AUTO: 30.2 PG (ref 26.6–33)
MCHC RBC AUTO-ENTMCNC: 33.2 G/DL (ref 31.5–35.7)
MCV RBC AUTO: 90.9 FL (ref 79–97)
MONOCYTES # BLD AUTO: 0.41 10*3/MM3 (ref 0.1–0.9)
MONOCYTES NFR BLD AUTO: 5.9 % (ref 5–12)
NEUTROPHILS NFR BLD AUTO: 3.93 10*3/MM3 (ref 1.7–7)
NEUTROPHILS NFR BLD AUTO: 57 % (ref 42.7–76)
NRBC BLD AUTO-RTO: 0 /100 WBC (ref 0–0.2)
PLATELET # BLD AUTO: 278 10*3/MM3 (ref 140–450)
PMV BLD AUTO: 11.8 FL (ref 6–12)
POTASSIUM SERPL-SCNC: 3.8 MMOL/L (ref 3.5–5.2)
PROT SERPL-MCNC: 7.7 G/DL (ref 6–8.5)
RBC # BLD AUTO: 4.51 10*6/MM3 (ref 3.77–5.28)
SODIUM SERPL-SCNC: 138 MMOL/L (ref 136–145)
WBC NRBC COR # BLD AUTO: 6.9 10*3/MM3 (ref 3.4–10.8)

## 2024-06-10 PROCEDURE — 86037 ANCA TITER EACH ANTIBODY: CPT

## 2024-06-10 PROCEDURE — 83520 IMMUNOASSAY QUANT NOS NONAB: CPT

## 2024-06-10 PROCEDURE — 86038 ANTINUCLEAR ANTIBODIES: CPT

## 2024-06-10 PROCEDURE — 81374 HLA I TYPING 1 ANTIGEN LR: CPT

## 2024-06-10 PROCEDURE — 83516 IMMUNOASSAY NONANTIBODY: CPT

## 2024-06-10 PROCEDURE — 36415 COLL VENOUS BLD VENIPUNCTURE: CPT

## 2024-06-10 PROCEDURE — 86160 COMPLEMENT ANTIGEN: CPT

## 2024-06-10 PROCEDURE — 86376 MICROSOMAL ANTIBODY EACH: CPT

## 2024-06-10 PROCEDURE — 86147 CARDIOLIPIN ANTIBODY EA IG: CPT

## 2024-06-10 PROCEDURE — 86140 C-REACTIVE PROTEIN: CPT

## 2024-06-10 PROCEDURE — 86200 CCP ANTIBODY: CPT

## 2024-06-10 PROCEDURE — 85652 RBC SED RATE AUTOMATED: CPT

## 2024-06-10 PROCEDURE — 80053 COMPREHEN METABOLIC PANEL: CPT

## 2024-06-10 PROCEDURE — 85025 COMPLETE CBC W/AUTO DIFF WBC: CPT

## 2024-06-10 PROCEDURE — 86015 ACTIN ANTIBODY EACH: CPT

## 2024-06-10 PROCEDURE — 86800 THYROGLOBULIN ANTIBODY: CPT

## 2024-06-10 PROCEDURE — 86381 MITOCHONDRIAL ANTIBODY EACH: CPT

## 2024-06-10 PROCEDURE — 82784 ASSAY IGA/IGD/IGG/IGM EACH: CPT

## 2024-06-10 PROCEDURE — 86431 RHEUMATOID FACTOR QUANT: CPT

## 2024-06-10 PROCEDURE — 86148 ANTI-PHOSPHOLIPID ANTIBODY: CPT

## 2024-06-10 PROCEDURE — 86364 TISS TRNSGLTMNASE EA IG CLAS: CPT

## 2024-06-10 PROCEDURE — 86146 BETA-2 GLYCOPROTEIN ANTIBODY: CPT

## 2024-06-10 PROCEDURE — 99204 OFFICE O/P NEW MOD 45 MIN: CPT | Performed by: INTERNAL MEDICINE

## 2024-06-10 PROCEDURE — 86258 DGP ANTIBODY EACH IG CLASS: CPT

## 2024-06-10 PROCEDURE — 80074 ACUTE HEPATITIS PANEL: CPT

## 2024-06-10 NOTE — PATIENT INSTRUCTIONS
Antinuclear Antibody Test    Why am I having this test?  This is a test that is used to help diagnose systemic lupus erythematosus (SLE) and other autoimmune diseases. An autoimmune disease is a disease in which the body's own defense system (immune system) attacks its organs.  What is being tested?  This test checks for antinuclear antibodies (AIDA) in the blood. The presence of AIDA is associated with several autoimmune diseases. It is seen in almost all people with lupus.  What kind of sample is taken?    A blood sample is required for this test. It is usually collected by inserting a needle into a blood vessel.  How are the results reported?  Your test results will be reported as either positive or negative.  What do the results mean?  A positive test result may mean that you have:  Lupus.  Other autoimmune diseases, such as rheumatoid arthritis, scleroderma, or Sjögren syndrome.  Talk with your health care provider about what your results mean. In some cases, your health care provider may do more testing to confirm the results. More testing may be done because other conditions can sometimes cause a positive result, such as:  Liver dysfunction.  Myasthenia gravis.  Infectious mononucleosis.  Questions to ask your health care provider  Ask your health care provider, or the department that is doing the test:  When will my results be ready?  How will I get my results?  What are my treatment options?  What other tests do I need?  What are my next steps?  Summary  This is a test that is used to help diagnose systemic lupus erythematosus (SLE) and other autoimmune diseases. An autoimmune disease is a disease in which the body's own defense system (immune system) attacks the body.  This test checks for antinuclear antibodies (AIDA) in the blood. The presence of AIDA is associated with several autoimmune diseases. It is seen in almost all people with lupus.  Your test results will be reported as either positive or negative.  Talk with your health care provider about what your results mean.  This information is not intended to replace advice given to you by your health care provider. Make sure you discuss any questions you have with your health care provider.  Document Revised: 08/21/2022 Document Reviewed: 08/21/2022  Elsevier Patient Education © 2024 [employer]

## 2024-06-10 NOTE — ASSESSMENT & PLAN NOTE
[facility]  [facility]    Outside GI records reviewed

## 2024-06-10 NOTE — PROGRESS NOTES
Office Visit       Date: 06/10/2024   Patient Name: Leatha Trejo  MRN: [id number]  YOB: 1990    Referring Physician: Yajaira Gee PA-C     Chief Complaint:   Chief Complaint   Patient presents with    Joint Pain    Fatigue    Positive AIDA       History of Present Illness: Leatha Trejo is a 33 y.o. female who is here today in consultation from her primary care provider for positive AIDA and arthralgias of multiple joints      Subjective     Review of Systems: Review of Systems   Constitutional:  Positive for fatigue. Negative for chills, fever and unexpected weight loss.   HENT:  Positive for tinnitus. Negative for mouth sores, sinus pressure and sore throat.         Dry mouth  Nose sores   Eyes:  Negative for pain and redness.        Dry eyes   Respiratory:  Negative for cough and shortness of breath.    Cardiovascular:  Negative for chest pain.   Gastrointestinal:  Positive for abdominal pain, constipation, diarrhea and nausea. Negative for blood in stool, vomiting and GERD.   Endocrine: Positive for cold intolerance. Negative for polydipsia and polyuria.   Genitourinary:  Negative for dysuria, genital sores and hematuria.   Musculoskeletal:  Negative for arthralgias, back pain, joint swelling, myalgias, neck pain and neck stiffness.   Skin:  Negative for rash and bruise.        Psoriasis  Photosensitvity  Malar rash   Allergic/Immunologic: Negative for immunocompromised state.   Neurological:  Negative for seizures, weakness, numbness and memory problem.   Hematological:  Negative for adenopathy. Bruises/bleeds easily.   Psychiatric/Behavioral:  Positive for depressed mood. The patient is nervous/anxious.         Past Medical History:   Past Medical History:   Diagnosis Date    Anxiety     Depression     IBS (irritable bowel syndrome)     Insomnia        Past Surgical History:   Past Surgical History:   Procedure Laterality Date     SECTION      TONSILLECTOMY   "2011       Family History:   Family History   Problem Relation Age of Onset    Obesity Mother     Arthritis Mother         OA and RA    Depression Mother     Diabetes Mother     Diabetes Father     Depression Father     Hyperlipidemia Father     Other Father         IBD       Social History:   Social History     Socioeconomic History    Marital status:    Tobacco Use    Smoking status: Never    Smokeless tobacco: Never   Vaping Use    Vaping status: Never Used   Substance and Sexual Activity    Alcohol use: Not Currently    Drug use: Never    Sexual activity: Yes     Partners: Male     Comment:  had vasectomy 12/2019.       Medications:   Current Outpatient Medications:     Crisaborole (Eucrisa) 2 % ointment, Eucrisa 2 % topical ointment  APPLY TO HANDS TWICE DAILY WHEN NOT USING OTHER, Disp: , Rfl:     Dupixent 300 MG/2ML solution pen-injector, Every 2 (Two) Months., Disp: , Rfl:     gabapentin (NEURONTIN) 100 MG capsule, Take 1-2 capsules by mouth Every Night., Disp: , Rfl:     sertraline (ZOLOFT) 100 MG tablet, Take 2 tablets by mouth Daily., Disp: , Rfl:     Sulfacetamide Sodium, Acne, 10 % lotion, APPLY TOPICALLY TO THE AFFECTED AREA 1 TO 2 TIMES DAILY, Disp: , Rfl:     traZODone (DESYREL) 50 MG tablet, Take 1-2 tablets by mouth every night at bedtime., Disp: , Rfl:     Allergies: No Known Allergies    I have reviewed and updated the patient's chief complaint, history of present illness, review of systems, past medical history, surgical history, family history, social history, medications and allergy list as appropriate.     Objective      Vital Signs:   Vitals:    06/10/24 1536   BP: 92/62   BP Location: Left arm   Patient Position: Sitting   Cuff Size: Adult   Pulse: 68   Temp: 97.6 °F (36.4 °C)   Weight: 80.1 kg (176 lb 9.6 oz)   Height: 176.5 cm (69.49\")   PainSc:   1   PainLoc: Finger  Comment: right thumb     Body mass index is 25.71 kg/m².       Physical Exam:  Physical Exam " "  MUSCULOSKELETAL:   No peripheral synovitis.  No dactylitis.  No pitting of the nails.  No rheumatoid nodules or tophi.  No joint deformity.  Hypermobility of the joints present.  She is able to touch thumb to forearm and hyperextend elbows.  Negative tender points    Complete joint exam was performed including the MCPs, PIPs, DIPs of the hands, wrists, elbows, shoulders, hips, knees and ankles.  No soft tissue swelling or tenderness is present except as above.    General: The patient is well-developed and well nourished. Cooperative, alert and oriented. Affect is normal. Hydration appears normal.   HEENT: Normocephalic and atraumatic. No notable alopecia. Lids and conjunctiva are normal. Pupils are equal and sclera are clear. Oropharynx is clear   NECK neck is supple without adenopathy, masses or thyromegaly.   CARDIOVASCULAR: Regular rate and rhythm. No murmurs, rubs or gallops   LUNGS: Effort is normal. Lungs are clear bilateral   ABDOMEN: Not examined  EXTREMITIES: Peripheral pulses are intact. No clubbing.   SKIN: No rashes. No subcutaneous nodules. No digital ulcers. No sclerodactyly.   NEUROLOGIC: Gait is normal. Strength testing is normal.  No focal neurologic deficits    Results Review:   Labs:    Lab Results   Component Value Date    GLUCOSE 91 05/31/2024    BUN 13 05/31/2024    CREATININE 0.67 05/31/2024    EGFRRESULT 118 05/31/2024    BCR 19 05/31/2024    K 4.5 05/31/2024    CO2 24 05/31/2024    CALCIUM 9.3 05/31/2024    PROTENTOTREF 6.8 05/31/2024    ALBUMIN 4.3 05/31/2024    BILITOT 0.8 05/31/2024    AST 27 05/31/2024    ALT 43 (H) 05/31/2024     Lab Results   Component Value Date    WBC 7.1 04/12/2024    HGB 13.1 04/12/2024    HCT 39.0 04/12/2024    MCV 91 04/12/2024     04/12/2024     Lab Results   Component Value Date    SEDRATE 2 04/12/2024     No results found for: \"CRP\"  No results found for: \"QUANTIFERO\", \"QUANTITB1\", \"QUANTITB2\", \"QUANTIFERN\", \"QUANTIFERM\", \"QUANTITBGLDP\"  No results " "found for: \"RF\"  Lab Results   Component Value Date    HEPCVIRUSABY Non Reactive 04/12/2024           Procedures    Assessment / Plan        Assessment & Plan  AIDA positive  Pediatric physical therapist,  with 2 children  Labs 4/12/2024: +AIDA direct no titer, negative RF, negative ds DNA/hep C virus, normal ESR, normal CBC, CMP with mild ALT elevation but otherwise normal, normal TSH, normal B12, vitamin D low at 23  MRI brain 10/22/2022: Normal findings  Previously saw Dr. [facility] rheumatology for positive AIDA 2022        Patient sent in consultation from PCP for positive AIDA direct with arthralgias hips, knees, chronic fatigue with mild ALT elevation, irritable bowel.  Labs have also shown normal inflammatory markers, negative double-stranded DNA, negative hepatitis C virus, negative rheumatoid factor.  Her AIDA has been positive at least since 2022  She reports chronic fatigue dating back to her teenage years after an episode of mono.  She has had sleep study showing mild sleep apnea and restless legs.    There are no clinical features of rheumatoid arthritis psoriatic arthritis, scleroderma, vasculitis, lupus or connective tissue disease. No Raynaud's, no malar rash, no oral/nasal ulcers, no pleurisy/pericarditis, no seizure disorder, no renal or hematologic abnormality.  No clotting disorder.  No photosensitivity.  No evidence inflammatory arthritis    An AIDA test is a nonspecific test.  While it certainly can be positive in conditions like SLE, scleroderma, myositis, Sjogren's, RA, vasculitis, etc., it can also be positive in patients with thyroid disease, type 1 diabetes, psoriasis, celiac disease, inflammatory bowel disease, COPD/chronic lung disease, cancers etc.  There are also reports of normal/apparently healthy individuals who are incidentally found to have a positive AIDA test.  You can also frequently get a false positive AIDA test.  Positive AIDA results increased with age.  15% of " patients over the age of 65 are AIDA positive, along with 5% of the general population.    Ddx positive AIDA also includes celiac disease, autoimmune hepatitis, autoimmune gastrointestinal diseases such as primary biliary cholangitis    -Further lab testing ordered as below for workup of positive AIDA  -She has upcoming continued workup with GI/hepatology in [place] for mild ALT elevation  -She may have Ioana-Danlos syndrome with her hypermobility of joints, but I do not think this would account for her positive AIDA  -Hypermobility of joints may account for her arthralgias.  Sometimes fibromyalgia can be seen with Ioana-Danlos/hypermobility, but she does not seem to have fibromyalgia on exam today    Overall low suspicion of systemic inflammatory rheumatic disease at this time.  As long as below rheumatologic labs ordered today come back unremarkable beyond positive AIDA, I will have the patient return on an as-needed basis and continue to follow with their primary provider.  Patient understands that should they have any rheumatologic concerns in the future to give us a call and I will be happy to re-evaluate.  It was a pleasure to see the patient in clinic today.  Thank you for allowing me to participate in the care of this patient      Arthralgia of multiple sites  No evidence of inflammatory arthritis on exam.  Sed rate, rheumatoid factor negative    Irritable bowel syndrome, unspecified type  Follows with GI in [place] [facility] / Dr Chen    Outside GI records reviewed  LFT elevation  Mild isolated ALT elevation.  Unspecified basis  Undergoing workup with GI/hepatology of the [facility] in [place] Dr Ney Chen  Rule out celiac disease, autoimmune hepatitis, both of which could cause positive AIDA  Other fatigue  Chronic fatigue dating back to her teenage years starting after an episode of mono  RLS (restless legs syndrome)  On gabapentin  SANJUANITA  (obstructive sleep apnea)  Reportedly mild on sleep study    Orders Placed This Encounter   Procedures    AIDA by IFA, Reflex 9-biomarkers profile    ANCA Panel    CBC Auto Differential    Comprehensive Metabolic Panel    C-reactive Protein    Sedimentation Rate    Rheumatoid Factor    Cyclic Citrul Peptide Antibody, IgG / IgA    HLA-B27 Antigen    Urinalysis With Culture If Indicated -    Anticardiolipin Antibody, IgG / M, Qn    Antiphospholipid Antibody    Beta-2 Glycoprotein Antibodies    C4+C3    Celiac Panel Reflex To Titer    Thyroid Antibodies    Hepatitis Panel, Acute    Anti-Smooth Muscle Antibody Titer    Mitochondrial Antibodies, M2        TIME SPENT: I spent 45 minutes caring for the patient on this date of service.  This time includes time spent by me in the following activities: Preparing for the visit, obtaining records, reviewing/ordering tests and independently reviewing results, performing a medically appropriate history/exam, counseling and educating the patient/family/caregiver, ordering medications, tests, or procedures, and documenting information in the medical record.      Follow Up: PRN  Return if symptoms worsen or fail to improve.        Woody Fair MD  [facility] [place]

## 2024-06-10 NOTE — ASSESSMENT & PLAN NOTE
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 [facility] rheumatology for positive AIDA 2022        Patient sent in consultation from PCP for positive AIDA direct with arthralgias hips, knees, chronic fatigue with mild ALT elevation, irritable bowel.  Labs have also shown normal inflammatory markers, negative double-stranded DNA, negative hepatitis C virus, negative rheumatoid factor.  Her AIDA has been positive at least since 2022  She reports chronic fatigue dating back to her teenage years after an episode of mono.  She has had sleep study showing mild sleep apnea and restless legs.    There are no clinical features of rheumatoid arthritis psoriatic arthritis, scleroderma, vasculitis, lupus or connective tissue disease. No Raynaud's, no malar rash, no oral/nasal ulcers, no pleurisy/pericarditis, no seizure disorder, no renal or hematologic abnormality.  No clotting disorder.  No photosensitivity.  No evidence inflammatory arthritis    An AIDA test is a nonspecific test.  While it certainly can be positive in conditions like SLE, scleroderma, myositis, Sjogren's, RA, vasculitis, etc., it can also be positive in patients with thyroid disease, type 1 diabetes, psoriasis, celiac disease, inflammatory bowel disease, COPD/chronic lung disease, cancers etc.  There are also reports of normal/apparently healthy individuals who are incidentally found to have a positive AIDA test.  You can also frequently get a false positive AIDA test.  Positive AIDA results increased with age.  15% of patients over the age of 65 are AIDA positive, along with 5% of the general population.    Ddx positive AIDA also includes celiac disease, autoimmune hepatitis, autoimmune  gastrointestinal diseases such as primary biliary cholangitis    -Further lab testing ordered as below for workup of positive AIDA  -She has upcoming continued workup with GI/hepatology in [place] for mild ALT elevation  -She may have Ioana-Danlos syndrome with her hypermobility of joints, but I do not think this would account for her positive AIDA  -Hypermobility of joints may account for her arthralgias.  Sometimes fibromyalgia can be seen with Ioana-Danlos/hypermobility, but she does not seem to have fibromyalgia on exam today    Overall low suspicion of systemic inflammatory rheumatic disease at this time.  As long as below rheumatologic labs ordered today come back unremarkable beyond positive AIDA, I will have the patient return on an as-needed basis and continue to follow with their primary provider.  Patient understands that should they have any rheumatologic concerns in the future to give us a call and I will be happy to re-evaluate.  It was a pleasure to see the patient in clinic today.  Thank you for allowing me to participate in the care of this patient

## 2024-06-10 NOTE — ASSESSMENT & PLAN NOTE
Mild isolated ALT elevation.  Unspecified basis  Undergoing workup with GI/hepatology of the [facility] in [place] Dr Brewster Case  Rule out celiac disease, autoimmune hepatitis, both of which could cause positive AIDA

## 2024-06-11 LAB
C3 SERPL-MCNC: 117 MG/DL (ref 82–167)
C4 SERPL-MCNC: 28 MG/DL (ref 14–44)
HAV IGM SERPL QL IA: NORMAL
HBV CORE IGM SERPL QL IA: NORMAL
HBV SURFACE AG SERPL QL IA: NORMAL
HCV AB SER QL: NORMAL

## 2024-06-12 ENCOUNTER — TELEPHONE (OUTPATIENT)
Dept: FAMILY MEDICINE CLINIC | Facility: CLINIC | Age: 34
End: 2024-06-12
Payer: COMMERCIAL

## 2024-06-12 LAB
ANA SER QL IF: NEGATIVE
C-ANCA TITR SER IF: NORMAL TITER
CCP IGA+IGG SERPL IA-ACNC: 1 UNITS (ref 0–19)
GLIADIN PEPTIDE IGA SER-ACNC: 4 UNITS (ref 0–19)
IGA SERPL-MCNC: 225 MG/DL (ref 87–352)
LABORATORY COMMENT REPORT: NORMAL
MITOCHONDRIA M2 IGG SER-ACNC: <20 UNITS (ref 0–20)
MYELOPEROXIDASE AB SER IA-ACNC: <0.2 UNITS (ref 0–0.9)
P-ANCA ATYPICAL TITR SER IF: NORMAL TITER
P-ANCA TITR SER IF: NORMAL TITER
PROTEINASE3 AB SER IA-ACNC: <0.2 UNITS (ref 0–0.9)
SMA IGG SER-ACNC: 10 UNITS (ref 0–19)
THYROGLOB AB SERPL-ACNC: <1 IU/ML (ref 0–0.9)
THYROPEROXIDASE AB SERPL-ACNC: 10 IU/ML (ref 0–34)
TTG IGA SER-ACNC: <2 U/ML (ref 0–3)

## 2024-06-12 NOTE — LETTER
June 17, 2024    Leatha Maya  [facility] [facility] [address]      Dear Ms. Trejo:    There is a message in your chart. If you can please call the office at [phone].Thank you!         Sincerely,  Yajaira Gee PA-C

## 2024-06-12 NOTE — TELEPHONE ENCOUNTER
LVM for pt to call back  HUB TO RELAY  Please let know recheck on liver enzyme continues to show mild elevation to one live enzyme, ALT- please keep f/up with her GI specialist; thanks

## 2024-06-13 LAB
B2 GLYCOPROT1 IGA SER-ACNC: <9 GPI IGA UNITS (ref 0–25)
B2 GLYCOPROT1 IGG SER-ACNC: <9 GPI IGG UNITS (ref 0–20)
B2 GLYCOPROT1 IGM SER-ACNC: <9 GPI IGM UNITS (ref 0–32)

## 2024-06-17 NOTE — TELEPHONE ENCOUNTER
LVM for pt to call back  HUB TO RELAY  Please let know recheck on liver enzyme continues to show mild elevation to one live enzyme, ALT- please keep f/up with her GI specialist; thanks    Sending letter 3rd attempt trying to reach by phone.

## 2024-06-21 LAB — HLA-B27 QL NAA+PROBE: NEGATIVE

## 2024-08-26 NOTE — PROGRESS NOTES
Chief Complaint  Fatigue, Morning Headaches, Daytime Sleepiness, Dry Mouth, and Frequent Awakenings    Subjective     History of Present Illness:  Leatha Trejo is a 33 y.o. female with anxiety, depression, and IBS.  The patient is referred by Yajaira Gee PA-C with primary care.  Patient has a history of sleep apnea on PAP therapy.  Patient was intolerant of this.  Review of self-reported questionnaire notes symptoms including daytime sleepiness and fatigue, frequent awakening, morning headaches, nonrestorative sleep, grinding teeth, leg and body jerks, frequent nighttime urination, difficulty staying asleep, and lack of concentration.  Patient had a sleep study at [facility] in 2022.  Patient typically goes to bed at 9-9:30 PM waking at 6 AM on weekdays and 6:30 AM on weekends.  She estimates an average of 6-7 hours of sleep per night and takes approximately 10 to 15 minutes for her to get to sleep.  Patient denies use of tobacco, alcohol, or illicit/recreational drugs.  She drinks 1 to 2 cups regular coffee daily, and one 8 ounce cans of regular cola daily.  Patient's significant other reports episodes of pauses in breathing and kicking and jerking frequently. She tried PAP therapy but she did not like it. She tried different masks but could not get used to it.     Further details are as follows:    Edgerton Scale is (out of 24): Total score: 7     Estimated average amount of sleep per night: 6-7 hours  Number of times she wakes up at night: 3.5 hours  Difficulty falling back asleep: yes  It usually takes 10-15 minutes to go to sleep.  She feels sleepy upon waking up: yes  Rotating or night shift work: no    Drowsiness/Sleepiness:  She exhibits the following:  excessive daytime sleepiness  excessive daytime fatigue  falls asleep watching TV  Naps on the weekends occasionally    Snoring/Breathing:  She exhibits the following:  loud snoring, quits breathing at night, awakens with dry mouth, and  morning headaches    Head Injury:  She exhibits the following:  No    Reflux:  She describes the following:  none    Narcolepsy:  She exhibits the following:  none    RLS/PLMs:  She describes the following:  discomfort in legs with an urge to move them- improved with gabapentin    Insomnia:  She describes the following:  frequent awakenings  restless sleep    Parasomnia:  She exhibits the following:  grinds teeth    Weight:       24  0833   Weight: 79.4 kg (175 lb 1.6 oz)      Weight change in the last year:  gain: 0 lbs    The patient's relevant past medical, surgical, family, and social history reviewed and updated in Epic as appropriate.    Review of Systems   Constitutional:  Positive for fatigue.   HENT: Negative.     Eyes: Negative.    Respiratory: Negative.     Cardiovascular: Negative.    Gastrointestinal:  Positive for constipation and diarrhea.   Endocrine: Positive for cold intolerance.   Genitourinary: Negative.    Musculoskeletal:  Positive for back pain.   Skin: Negative.    Allergic/Immunologic: Negative.    Neurological: Negative.    Hematological: Negative.    Psychiatric/Behavioral:  Positive for decreased concentration and sleep disturbance. The patient is nervous/anxious.    All other systems reviewed and are negative.      PMH:    Past Medical History:   Diagnosis Date    Anxiety     Depression     IBS (irritable bowel syndrome)     Insomnia      Past Surgical History:   Procedure Laterality Date     SECTION      TONSILLECTOMY       OB History    No obstetric history on file.       No Known Allergies    MEDS:  Prior to Admission medications    Medication Sig Start Date End Date Taking? Authorizing Provider   Crisaborole (Eucrisa) 2 % ointment Eucrisa 2 % topical ointment   APPLY TO HANDS TWICE DAILY WHEN NOT USING OTHER    Alyssa Husain MD   Dupixent 300 MG/2ML solution pen-injector Every 2 (Two) Months. 22   ProviderAlyssa MD   gabapentin (NEURONTIN) 100 MG  "capsule Take 1-2 capsules by mouth Every Night. 8/19/22   Alyssa Husain MD   sertraline (ZOLOFT) 100 MG tablet Take 2 tablets by mouth Daily. 9/15/22   Alyssa Husain MD   Sulfacetamide Sodium, Acne, 10 % lotion APPLY TOPICALLY TO THE AFFECTED AREA 1 TO 2 TIMES DAILY 8/1/22   Alyssa Husain MD   traZODone (DESYREL) 50 MG tablet Take 1-2 tablets by mouth every night at bedtime. 9/15/22   Alyssa Husain MD       FH:  Family History   Problem Relation Age of Onset    Obesity Mother     Arthritis Mother         OA and RA    Depression Mother     Diabetes Mother     Diabetes Father     Depression Father     Hyperlipidemia Father     Other Father         IBD       Objective   Vital Signs:  /60   Pulse 82   Temp 97.1 °F (36.2 °C) (Temporal)   Ht 176.5 cm (69.49\")   Wt 79.4 kg (175 lb 1.6 oz)   SpO2 97%   BMI 25.50 kg/m²     BMI is >= 25 and <30. (Overweight) The following options were offered after discussion;: works on her diet and light exercise with daily walks.        Physical Exam  Vitals reviewed.   Constitutional:       Appearance: Normal appearance.   HENT:      Head: Normocephalic and atraumatic.      Nose: Nose normal.      Mouth/Throat:      Mouth: Mucous membranes are moist.   Cardiovascular:      Rate and Rhythm: Normal rate and regular rhythm.      Heart sounds: No murmur heard.     No friction rub. No gallop.   Pulmonary:      Effort: Pulmonary effort is normal. No respiratory distress.      Breath sounds: Normal breath sounds. No wheezing or rhonchi.   Neurological:      Mental Status: She is alert and oriented to person, place, and time.   Psychiatric:         Behavior: Behavior normal.       Mallampati Score: III (soft and hard palate and base of uvula visible)    Result Review :              Assessment and Plan  Leatha Trejo is a very pleasant 33 y.o. female with anxiety, depression, and IBS who presents to establish care for SANJUANITA and for further evaluation of " excessive daytime sleepiness and fatigue, and nonrestorative sleep.  She had been using a PAP device but was unable to tolerate this as she is a stomach sleeper and moves around while sleeping.  She eventually discontinued use though she retains the device.  She has a history of mild obstructive sleep apnea on a sleep study obtained in 2022.  I have discussed alternatives to PAP therapy including mandibular advancement device, and surgical consult.  The patient wishes to retry PAP therapy with nasal cushions.  I will place orders for supplies and have asked her to return for follow-up in approximately 3 months.    Patient is prescribed gabapentin for restless leg syndrome which does seem to help.  I note that she has multiple medications that can contribute to daytime sleepiness and fatigue.  She reports waking frequently throughout the night.  She does have a history of anxiety which is likely contributing to this.  I have given her information on insomnia and directed her to the [facility] online program for cognitive behavioral therapy.  She has found trazodone to be helpful.    Diagnoses and all orders for this visit:    1. Obstructive sleep apnea, adult (Primary)  -     PAP Therapy    2. Restless leg syndrome                 I discussed the consequences of uncontrolled sleep apnea including hypertension, heart disease, diabetes, stroke, and dementia. I further discussed sleep apnea therapeutic options including CPAP, Weight loss, Oral dental appliance, and surgery.         Follow Up  Return in about 3 months (around 11/30/2024) for Recheck, Video visit.  Patient was given instructions and counseling regarding her condition or for health maintenance advice. Please see specific information pulled into the AVS if appropriate.     CARROLL Marin, BannerP-BC  Pulmonology, Critical Care, and Sleep Medicine

## 2024-08-30 ENCOUNTER — PATIENT ROUNDING (BHMG ONLY) (OUTPATIENT)
Dept: SLEEP MEDICINE | Age: 34
End: 2024-08-30
Payer: COMMERCIAL

## 2024-08-30 ENCOUNTER — OFFICE VISIT (OUTPATIENT)
Dept: SLEEP MEDICINE | Age: 34
End: 2024-08-30
Payer: COMMERCIAL

## 2024-08-30 VITALS
HEIGHT: 69 IN | TEMPERATURE: 97.1 F | BODY MASS INDEX: 25.93 KG/M2 | HEART RATE: 82 BPM | DIASTOLIC BLOOD PRESSURE: 60 MMHG | SYSTOLIC BLOOD PRESSURE: 110 MMHG | OXYGEN SATURATION: 97 % | WEIGHT: 175.1 LBS

## 2024-08-30 DIAGNOSIS — G25.81 RESTLESS LEG SYNDROME: ICD-10-CM

## 2024-08-30 DIAGNOSIS — G47.33 OBSTRUCTIVE SLEEP APNEA, ADULT: Primary | ICD-10-CM

## 2024-08-30 RX ORDER — BUPROPION HYDROCHLORIDE 150 MG/1
1 TABLET ORAL DAILY
COMMUNITY
Start: 2024-06-26

## 2024-08-30 NOTE — PROGRESS NOTES
August 30, 2024    Hello, may I speak with Leatha Trejo?    My name is Janet      I am  with [facility]  [address] [address] [phone].    Before we get started may I verify your date of birth? 1990    I am calling to officially welcome you to our practice and ask about your recent visit. Is this a good time to talk? yes    Tell me about your visit with us. What things went well?  The visit went very well.       We're always looking for ways to make our patients' experiences even better. Do you have recommendations on ways we may improve?  no    Overall were you satisfied with your first visit to our practice? yes       I appreciate you taking the time to speak with me today. Is there anything else I can do for you? no      Thank you, and have a great day.

## 2025-06-10 ENCOUNTER — TELEPHONE (OUTPATIENT)
Dept: SLEEP MEDICINE | Age: 35
End: 2025-06-10
Payer: COMMERCIAL

## 2025-06-18 ENCOUNTER — TELEMEDICINE (OUTPATIENT)
Dept: SLEEP MEDICINE | Age: 35
End: 2025-06-18
Payer: COMMERCIAL

## 2025-06-18 VITALS — HEIGHT: 69 IN | BODY MASS INDEX: 24.44 KG/M2 | WEIGHT: 165 LBS

## 2025-06-18 DIAGNOSIS — G47.33 OBSTRUCTIVE SLEEP APNEA, ADULT: Primary | ICD-10-CM

## 2025-06-18 DIAGNOSIS — G25.81 RESTLESS LEG SYNDROME: ICD-10-CM

## 2025-06-18 NOTE — PROGRESS NOTES
Sleep Clinic Video Visit Follow Up Note    The patient is located at their work address in [place], [place]. The patient presents today for telehealth service.  This service was conducted via audio/video technology through a secure [employer] video visit connection through Epic.  This provider is located in [facility].  Patient stated they are in a secure environment for the session.  Patient's condition being diagnosed/treated is appropriate for telemedicine.  The provider identified himself as well as his credentials.  The patient, and/or patient's guardian, consent to be seen remotely, and when consent is given they understanding that the consent allows for patient identifiable information to be sent to a third-party as needed.  They may refuse to be seen remotely at any time.  The electronic data is encrypted and password protected, and the patient and/or guardian has been advised of the potential risk to privacy not withstanding such measures.  Patient identifiers used: Name and date of birth.     You have chosen to receive care through a telehealth visit.  Do you consent to use a video/audio connection for your medical care today? Yes    Mode of Visit: Video  Location of patient: -WORK-  Location of provider: +[facility]  You have chosen to receive care through a telehealth visit.  The patient has signed the video visit consent form.  The visit included audio and video interaction. No technical issues occurred during this visit.      Chief Complaint  Follow up sleep apnea    Subjective     History of Present Illness (from previous encounter on 8/30/2024):  Leatha Trejo is a very pleasant 33 y.o. female with anxiety, depression, and IBS who presents to establish care for SANJUANITA and for further evaluation of excessive daytime sleepiness and fatigue, and nonrestorative sleep.  She had been using a PAP device but was unable to tolerate this as she is a stomach sleeper and moves around while sleeping.   She eventually discontinued use though she retains the device.  She has a history of mild obstructive sleep apnea on a sleep study obtained in .  I have discussed alternatives to PAP therapy including mandibular advancement device, and surgical consult.  The patient wishes to retry PAP therapy with nasal cushions.  I will place orders for supplies and have asked her to return for follow-up in approximately 3 months.     Patient is prescribed gabapentin for restless leg syndrome which does seem to help.  I note that she has multiple medications that can contribute to daytime sleepiness and fatigue.  She reports waking frequently throughout the night.  She does have a history of anxiety which is likely contributing to this.  I have given her information on insomnia and directed her to the [facility] online program for cognitive behavioral therapy.  She has found trazodone to be helpful.(End copied text)    Interval History:  Leatha Trejo is a 34 y.o. female who presents for follow-up and further discussion of alternative treatments to PAP therapy.  Patient was last seen by me on 2020 for at which time order was placed for supplies.  Patient has history of mild obstructive sleep apnea with an AHI of 6.2/H. She was started on a stimulant for ADHD.        Further details are as follows:    Ira Scale is:     Weight:    Current Weight: 165 lbs    Weight change in the last year:  loss: 10 lbs    The patient's relevant past medical, surgical, family, and social history reviewed and updated in Epic as appropriate.    PMH:    Past Medical History:   Diagnosis Date    Anxiety     Depression     IBS (irritable bowel syndrome)     Insomnia      Past Surgical History:   Procedure Laterality Date     SECTION      TONSILLECTOMY       OB History    No obstetric history on file.       No Known Allergies    MEDS:  Prior to Admission medications    Medication Sig Start Date End Date Taking?  "Authorizing Provider   buPROPion XL (WELLBUTRIN XL) 150 MG 24 hr tablet Take 1 tablet by mouth Daily. 6/26/24   Alyssa Husain MD   Crisaborole (Eucrisa) 2 % ointment Eucrisa 2 % topical ointment   APPLY TO HANDS TWICE DAILY WHEN NOT USING OTHER    Alyssa Husain MD   Dupixent 300 MG/2ML solution pen-injector Every 2 (Two) Months. 7/22/22   Alyssa Husain MD   gabapentin (NEURONTIN) 100 MG capsule Take 1-2 capsules by mouth Every Night. 8/19/22   Alyssa Husain MD   sertraline (ZOLOFT) 100 MG tablet Take 2 tablets by mouth Daily. 9/15/22   Alyssa Husain MD   Sulfacetamide Sodium, Acne, 10 % lotion APPLY TOPICALLY TO THE AFFECTED AREA 1 TO 2 TIMES DAILY 8/1/22   Alyssa Husain MD   traZODone (DESYREL) 50 MG tablet Take 1-2 tablets by mouth every night at bedtime. 9/15/22   Alyssa Husain MD         FH:  Family History   Problem Relation Age of Onset    Obesity Mother     Arthritis Mother         OA and RA    Depression Mother     Diabetes Mother     Diabetes Father     Depression Father     Hyperlipidemia Father     Other Father         IBD       Objective   Vital Signs:  Ht 176.5 cm (69.49\")   Wt 74.8 kg (165 lb)   BMI 24.03 kg/m²                Physical Exam  Constitutional:       General: She is not in acute distress.     Appearance: Normal appearance.   Neurological:      General: No focal deficit present.      Mental Status: She is alert and oriented to person, place, and time.   Psychiatric:         Mood and Affect: Mood normal.         Behavior: Behavior normal.         Thought Content: Thought content normal.         Judgment: Judgment normal.             Result Review :              Assessment and Plan  Leatha Trejo is a 34 y.o. female returns for follow-up of sleep apnea.  The patient has a device but has not been able to use it.  She inquires about alternatives, specifically dental appliance.  I note the patient has a history of mild obstructive sleep " apnea with an initial AHI of 6.2/H.  Dental appliance may be effective in this instance.  Additionally, the patient has lost about 10 pounds since her last visit and I note weight loss is helpful for sleep apnea.  Patient continues to have difficulty with daytime sleepiness though she was recently diagnosed with ADHD and started on stimulant therapy which has helped somewhat.  She also wakes with headaches occasionally.    Diagnoses and all orders for this visit:    1. Obstructive sleep apnea, adult (Primary)  -     Mandible Advancing Appliance    2. Restless leg syndrome                Follow Up  Return in about 6 months (around 12/18/2025) for Recheck after initiating MAD.  Patient was given instructions and counseling regarding her condition or for health maintenance advice. Please see specific information pulled into the AVS if appropriate.       CARROLL Marin, ACNP-BC  Pulmonology, Critical Care, and Sleep Medicine